# Patient Record
Sex: FEMALE | Race: WHITE | Employment: FULL TIME | URBAN - METROPOLITAN AREA
[De-identification: names, ages, dates, MRNs, and addresses within clinical notes are randomized per-mention and may not be internally consistent; named-entity substitution may affect disease eponyms.]

---

## 2017-01-30 PROBLEM — R10.13 EPIGASTRIC ABDOMINAL PAIN: Status: ACTIVE | Noted: 2017-01-30

## 2019-05-14 PROBLEM — K82.8 BILIARY DYSKINESIA: Status: ACTIVE | Noted: 2019-05-14

## 2019-05-14 PROBLEM — K30 NON-ULCER DYSPEPSIA: Status: ACTIVE | Noted: 2019-05-14

## 2019-05-16 PROBLEM — R10.9 ABDOMINAL PAIN: Status: ACTIVE | Noted: 2019-05-16

## 2021-01-28 PROBLEM — Z34.90 PREGNANCY: Status: ACTIVE | Noted: 2021-01-28

## 2021-12-28 ENCOUNTER — HOSPITAL ENCOUNTER (OUTPATIENT)
Dept: PHYSICAL THERAPY | Age: 31
Discharge: HOME | End: 2021-12-28
Payer: OTHER GOVERNMENT

## 2021-12-28 PROCEDURE — 97530 THERAPEUTIC ACTIVITIES: CPT

## 2021-12-28 PROCEDURE — 97162 PT EVAL MOD COMPLEX 30 MIN: CPT

## 2021-12-28 PROCEDURE — 97110 THERAPEUTIC EXERCISES: CPT

## 2021-12-28 NOTE — PROGRESS NOTES
[facility] [address]  [address], [address] [phone] fax    Plan of Care/ Statement of Necessity for Physical Therapy Services    Patient name: Erik Perdomo Start of Care: 2021   Referral source: Cb Carrillo DDS : 1990    Medical Diagnosis: Temporomandibular joint (TMJ) pain [M26.629]  Payor: Gilford Baars / Plan: [employer] HMO/CHOICE PLUS/POS / Product Type: HMO /    Onset Date:4 years ago - worsening of ss/sx within past year    Treatment Diagnosis: Pain in Temporomandibular Joint    Prior Hospitalization: see medical history Provider#: [id number]   Medications: Verified on Patient summary List    Comorbidities: Pt reports: Tobacco Use, Arthritis, Thyroid Problems, Psoriatic Arthritis, Cholecystectomy (), Laprascopic Cyst Removal (), Right Oophorectomy (),  ([id number])     Prior Level of Function: Ind/manageable pain performing ADL/IADLs, self-care tasks, recreational participation and vocational responsibilities          The Plan of Care and following information is based on the information from the initial evaluation. Assessment/ key information:   Patient presents to clinic secondary to insidious onset of right-sided jaw pain reported to have began around 4 years ago. Patient reports 3 months prior to initial onset, underwent root canal procedure on right side of mouth that she initially thought contributed to symptom presentation. States symptoms were manageable at that time, however has worsened within past year. Denies trauma to region, however notes changes in daily lifestyle habits and PMH correlated to around same time  including giving birth to child and beginning tobacco consumption. Underwent tooth extraction on right side a few months ago, however reports no relief.  Confirms hx of grinding/clinching teeth, w/ mouth guard recommended by dentist for nightwear, however pt reports difficulty with consistent use. Currently manages pain with NSAID use frequently throughout day (no recommendation made by MD for amount patient is currently consuming  pt ed on side effects of NSAIDs and to f/u with PCP in regards to current intake in order to maintain/receive recommendation on safe dosage; pt verbally demonstrates understanding of this). Todays clinical examination demonstrates soft tissue restrictions/TTP through right SCM, scalenes, B suboccipitals/cervical paraspinals, right masseter and temporalis, decreased function (evident by FOTO score), observable jaw deviation to right with mouth opening, audible click with left deviation (w/ report discomfort), AROM/flexibility deficits, and overall mobility limitations/compensatory movement patterns. These limitations affect the patient's ability to perform ADLs/IADLs, self care tasks, vocational responsibilities and recreational tasks efficiently, effectively and pain free. The patient would benefit from skilled physical therapy interventions to address the aforementioned impairments in order to return to her PLOF of completing all functional activities pain free and independently. Evaluation Complexity History MEDIUM  Complexity : 1-2 comorbidities / personal factors will impact the outcome/ POC ; Examination MEDIUM Complexity : 3 Standardized tests and measures addressing body structure, function, activity limitation and / or participation in recreation  ;Presentation MEDIUM Complexity : Evolving with changing characteristics  ; Clinical Decision Making MEDIUM Complexity : FOTO score of 26-74  Overall Complexity Rating: MEDIUM  Problem List: pain affecting function, decrease ROM, decrease strength, decrease ADL/ functional abilitiies, decrease activity tolerance and decrease flexibility/ joint mobility   Treatment Plan may include any combination of the following: Therapeutic exercise, Therapeutic activities, Neuromuscular re-education, Physical agent/modality, Manual therapy, Patient education, Self Care training and Functional mobility training   Patient / Family readiness to learn indicated by: asking questions, trying to perform skills and interest  Persons(s) to be included in education: patient (P)  Barriers to Learning/Limitations: None  Patient Goal (s): limit need for multiple doses of NSAIDs daily, decrease pain  Patient Self Reported Health Status: good  Rehabilitation Potential: good    Short Term Goals: To be accomplished in 2 treatments:   1. Patient will become proficient in their HEP and will be compliant in performing that program.   Evaluation: HEP established. Long Term Goals: To be accomplished in 5 weeks:   1. Patient's pain level will be 0-1/10 with activity in order to improve patient's ability to perform normal ADLs. Evaluation: 0-3/10 with activity; w/ NSAIDs consumed frequently throughout day  2. Patient will increase FOTO score to >/= 64 points to indicate increased functional mobility/independence. Evaluation: 35 points   3. Patient will report >/= 75% reduction in right jaw popping/clicking in order to demonstrate a return to PLOF   4. 3. Patient will demonstrate even alignment w/o presence of right lateral deviation upon mouth opening/closing >/= 90% of therapeutic opportunities in order to demonstrate a return to PLOF     Frequency / Duration: Patient to be seen 2 times per week for 5 weeks. Patient/ Caregiver education and instruction: Diagnosis, prognosis, self care, activity modification and exercises   [x]  Plan of care has been reviewed with AC Rubio DPT 12/28/2021 11:53 AM    ________________________________________________________________________    I certify that the above Therapy Services are being furnished while the patient is under my care. I agree with the treatment plan and certify that this therapy is necessary.     [address] [facility] Signature:____________Date:_________TIME:________     Radha Chapa DDS  ** Signature, Date and Time must be completed for valid certification **    Please sign and return to [facility] [place] [id number]  [address], [address] [phone] fax

## 2021-12-28 NOTE — PROGRESS NOTES
PT DAILY TREATMENT NOTE/TMJ EVAL     Patient Name: Dania Perdomo  Date:2021  : 1990  [x]  Patient  Verified  Payor: Uli Ruiz / Plan: [employer] HMO/CHOICE PLUS/POS / Product Type: HMO /    In time: 12:05P  Out time:12:45P  Total Treatment Time (min): 40  Visit #: 1 of 10    Treatment Area: Temporomandibular joint (TMJ) pain [M26.629]    SUBJECTIVE  Pain Level (0-10 scale): 2  []constant []intermittent []improving [x]worsening []no change since onset    Any medication changes, allergies to medications, adverse drug reactions, diagnosis change, or new procedure performed?: [x] No    [] Yes (see summary sheet for update)  Subjective functional status/changes:     PLOF: Ind/manageable pain performing ADL/IADLs, self-care tasks, recreational participation and vocational responsibilities   Limitations to PLOF: Increased Pain (Increased NSAID use to manage symptoms), Decreased Concentration/Efficacy with work tasks/ADL performance   Mechanism of Injury: Patient presents to clinic secondary to insidious onset of right-sided jaw pain reported to have began around 4 years ago and has worsened since. Patient reports 3 months prior to initial onset, underwent root canal procedure on right side of mouth that she initially thought contributed to symptom presentation. States symptoms were manageable at that time, however worsened within past year. Denies trauma to region, however notes changes in daily lifestyle habits and PMH correlated to around same time  including giving birth to child and beginning tobacco consumption. Underwent tooth extraction on right side a few months ago, however reports no relief. Confirms hx of grinding/clinching teeth, w/ mouth guard recommended by dentist for nightwear, however pt reports difficulty with consistent use.  Currently manages pain with NSAID use frequently throughout day (no recommendation made by MD for amount patient is currently consuming  pt ed on side effects of NSAIDs and to f/u with PCP in regards to current intake in order to maintain/receive recommendation on safe dosage; pt verbally demonstrates understanding of this). Current symptoms/Complaints: Describes symptoms as throbbing in right ear to right jaw  initially beginning as sporadic/intermittent, to now constant and more intense. Confirms intermittent popping/clicking on right side of jaw. Denies ringing of ears, jaw getting stuck/locked, difficulties opening/closing mouth (only one occurrence to note with this, however contributes to psoriatic arthritis flare up) changes in vision. States experiencing intermittent HAs, however no more than usual as she has hx of migraines. Aggravating factors include chewing (on left side of mouth), with easing factors including NSAIDs. Previous Treatment/Compliance: Has not participated in skilled PT services for current condition  PMHx/Surgical Hx: Pt reports: Tobocco Use, Arthritis, Thyroid Problems, Psoriatic Arthritis, Cholecystectomy (2016), Laprascopic Cyst Removal (2018), Right Oophorectomy (2019),  ([id number])    Work Hx: Full Time  Desk Position  [employer] (intermittently limited in efficiency of work performance with symptom flare up)  Pt Goals: limit need for multiple doses of NSAIDs daily, decrease pain      OBJECTIVE/EXAMINATION    20 min [x]? ?? Eval                  []? ? ? Re-Eval         10 min Therapeutic Exercise:  [x]? ?? See flow sheet :   Rationale: increase ROM and increase strength to improve the patients ability to perform ADLs with greater ease     10 min Therapeutic Activity:  [x]? ??  See flow sheet : (see pt ed below)   Rationale: increase patient awarness/education of relevant anatomy/patho, activity/positional modifications and prescribed HEP to improve the patients ability to return to PLOF          With   [x] TE   [x] TA   [] neuro   [x] other: Patient Education: [x] Review HEP    [] Progressed/Changed HEP based on:   [] positioning   [] body mechanics   [] transfers   [] heat/ice application    [x] other: pt ed on side effects of NSAIDs and to f/u with PCP in regards to current intake in order to maintain/receive recommendation on safe dosage; pt verbally demonstrates understanding of this)     Observation/Posture:  Equal face symmetry with mouth closed/at rest; Min fwd head  Mouth opening: Excess deviation to right   Lateral Deviation: audible click with left deviation     C-Spine ROM:   WFL in all directions; tightness to note with combined ext and right rotation    UE AROM:  WFL in all directions; tightness to note with right abduction    UE Strength:   4+/5 grossly     Mandible ROM:   WFL; Resisted mandible motions: no pain reported    Loading of TMJ:   NT; will test at next f/u    Palpation:  Mod TTP through right SCM, scalenes, B suboccipitals/cervical paraspinals, right masseter and temporalis    Pain Level (0-10 scale) post treatment: 2    ASSESSMENT/Changes in Function: See POC     Patient will continue to benefit from skilled PT services to modify and progress therapeutic interventions, address functional mobility deficits, address ROM deficits, address strength deficits, analyze and address soft tissue restrictions, analyze and cue movement patterns, analyze and modify body mechanics/ergonomics and assess and modify postural abnormalities to attain remaining goals.      [x]  See Plan of Care  []  See progress note/recertification  []  See Discharge Summary         Progress towards goals / Updated goals:  See POC    PLAN  [x]  Upgrade activities as tolerated     [x]  Continue plan of care  []  Update interventions per flow sheet       []  Discharge due to:_  []  Other:_      Smitha Abreu DPT 12/28/2021  9:10 AM

## 2022-01-04 ENCOUNTER — TELEPHONE (OUTPATIENT)
Dept: PHYSICAL THERAPY | Age: 32
End: 2022-01-04

## 2022-01-06 ENCOUNTER — APPOINTMENT (OUTPATIENT)
Dept: PHYSICAL THERAPY | Age: 32
End: 2022-01-06

## 2022-01-11 ENCOUNTER — APPOINTMENT (OUTPATIENT)
Dept: PHYSICAL THERAPY | Age: 32
End: 2022-01-11

## 2022-01-11 ENCOUNTER — TELEPHONE (OUTPATIENT)
Dept: PHYSICAL THERAPY | Age: 32
End: 2022-01-11

## 2022-01-13 ENCOUNTER — TELEPHONE (OUTPATIENT)
Dept: PHYSICAL THERAPY | Age: 32
End: 2022-01-13

## 2022-01-13 NOTE — PROGRESS NOTES
[facility] [address]  [address], [address] [phone] fax    Discharge Summary  Patient name: Michele Perdomo Start of Care: 2021   Referral source: Mammie Duane, DDS : 1990                Medical Diagnosis: Temporomandibular joint (TMJ) pain [M26.629]  Payor: Emma North / Plan: [employer] HMO/CHOICE PLUS/POS / Product Type: HMO /     Onset Date:4 years ago - worsening of ss/sx within past year                Treatment Diagnosis: Pain in Temporomandibular Joint    Prior Hospitalization: see medical history Provider#: [id number]   Medications: Verified on Patient summary List    Comorbidities: Pt reports: Tobacco Use, Arthritis, Thyroid Problems, Psoriatic Arthritis, Cholecystectomy (), Laprascopic Cyst Removal (), Right Oophorectomy (),  ([id number])     Prior Level of Function: Ind/manageable pain performing ADL/IADLs, self-care tasks, recreational participation and vocational responsibilities   Visits from Start of Care: 1    Missed Visits: 3  Reporting Period : 2021 to 2021    Goal: Patient will become proficient in their HEP and will be compliant in performing that program.   Status at last note/certification: unable to reassess   Status at discharge: not met    Goal: Patient's pain level will be 0-1/10 with activity in order to improve patient's ability to perform normal ADLs.    Status at last note/certification: unable to reassess   Status at discharge: not met    Goal: Patient will increase FOTO score to >/= 64 points to indicate increased functional mobility/independence.   Status at last note/certification: unable to reassess   Status at discharge: not met    Goal: Patient will report >/= 75% reduction in right jaw popping/clicking in order to demonstrate a return to PLOF   Status at last note/certification: unable to reassess   Status at discharge: not met    Goal: Patient will demonstrate even alignment w/o presence of right lateral deviation upon mouth opening/closing >/= 90% of therapeutic opportunities in order to demonstrate a return to PLOF  Status at last note/certification: unable to reassess   Status at discharge: not met    Assessment/Summary of care:   Pt was seen for the initial evaluation. Per pt's chart, the pt NS/cancelled three therapy appointments. Pt is d/c'ed from therapy secondary to non-compliance/cancellation policy and unable to reassess goals at this time.      RECOMMENDATIONS:  [x]Discontinue therapy: []Patient has reached or is progressing toward set goals      [x]Patient is non-compliant or has abdicated      []Due to lack of appreciable progress towards set goals    Sridevi Díaz, PT 1/13/2022 4:06 PM